# Patient Record
(demographics unavailable — no encounter records)

---

## 2025-05-08 NOTE — PHYSICAL EXAM
[General Appearance - Alert] : alert [Oriented To Time, Place, And Person] : oriented to person, place, and time [Cranial Nerves Optic (II)] : visual acuity intact bilaterally,  pupils equal round and reactive to light [Cranial Nerves Oculomotor (III)] : extraocular motion intact [Cranial Nerves Trigeminal (V)] : facial sensation intact symmetrically [Cranial Nerves Facial (VII)] : face symmetrical [Cranial Nerves Vestibulocochlear (VIII)] : hearing was intact bilaterally [Cranial Nerves Glossopharyngeal (IX)] : tongue and palate midline [Cranial Nerves Accessory (XI - Cranial And Spinal)] : head turning and shoulder shrug symmetric [Cranial Nerves Hypoglossal (XII)] : there was no tongue deviation with protrusion [Motor Tone] : muscle tone was normal in all four extremities [Motor Strength] : muscle strength was normal in all four extremities [Abnormal Walk] : normal gait [Balance] : balance was intact [PERRL With Normal Accommodation] : pupils were equal in size, round, reactive to light, with normal accommodation [Extraocular Movements] : extraocular movements were intact

## 2025-05-12 NOTE — ASSESSMENT
[FreeTextEntry1] : LIDIA CHOW is a 67 year female who presents with progression left eye vision loss with associated afferent pupillary defect OS and neuro-ophthalmologic evaluation consistent with progressive, compressive left optic neuropathy.  I independently reviewed and interpreted her latest MRI Brain Orbits with and without gadolinium 5/5/25 which demonstrates a small enhancing lesion of the left lesser sphenoid wing measuring approximately 6 x 7 x 4 mm which appears to elevate the intracranial segment of the left optic nerve. In retrospect, this lesion was present on MRI brain orbits in 4/18/22 and has progressed.  Natural history and alternative management strategies discussed. Surgical resection with optic nerve decompression discussed as a means to attempt to preserve remaining left eye vision. Risks of surgery including but not limited to bleeding, infection, vascular injury, brain hemorrhage, brain swelling, stroke, coma/death, weakness/paralysis, visual/sensory loss, left eye blindness, loss of speech/language/cognitive memory function, changes in personality/behavior, seizures, failure to resect, recurrence, need for subsequent procedures discussed. Ms. Guillaume and her  understand risks, benefits, alternatives, and are considering my recommendations. They understand that the goal of surgery would be to preserve current visual function in the left eye, that recovery of lost vision is uncertain and otherwise not predictable, and that there is a risk of complete loss of left eye vision with surgery. We did discuss the alternative management strategy of stereotactic radiosurgery/ radiotherapy which would confer significant risk of complete left eye visual loss but would avoid other risks associated with surgery. I have offered to facilitate a referral to Dr. Mike Greene and Dr. Bennett Su if they wish to further the discussion regarding radiosurgery/radiotherapy.   If she decides to proceed with surgery, we will schedule her for left pterional craniotomy for resection of lesser sphenoid wing meningioma and optic nerve decompression with frameless stereotactic navigation in the near future. She will require a frameless protocol MRI with gadolinium and frameless protocol CT head without contrast prior to the scheduled date for surgery. If she decides to investigate radiation therapy options in more detail, we will help facilitate expedited consultation with my partners Dr. Mike Greene and Dr. Bennett Su. If she decides not to proceed with either, then I have recommended a follow up MRI brain and orbits with and without gadolinium in 6 months with close clinical surveillance with Dr. Stuart to assess for interval progression. The patient and her  understand that my recommendation is to proceed with surgical resection and optic nerve decompression to maximize the chances of left eye vision preservation.  I have asked the patient and her  to contact me for any symptomatic development or progression in the interim at which time we can obtain expedited follow up imaging.  A total of 60 minutes was spent relative to this encounter.

## 2025-05-12 NOTE — HISTORY OF PRESENT ILLNESS
[de-identified] : LIDIA CHOW is a 67 year female with a PMH of breast cancer, melanoma, restless leg syndrome, lumbar radiculopathy, lumbar stenosis, who presents to the office today at the request of Dr. Goldy Stuart with her  in attendance for neurosurgical consultation due to a suspected meningioma and an incidental, suspected cavernous malformation. She was being evaluated for progressive loss of left eye vision, more significant over the past year.  She has a history of left optic neuropathy with an unrevealing workup under the direction of Dr. 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 This was stable for years but has new gradual deterioration over the past year. She has not been on any drugs toxic to the optic nerve and had completed chemotherapy in 2018.  Dr. Stuart's evaluation demonstrated worsening of left eyes visual fields and progressive optic atrophy compared to 2022. There is an interval increase in the thinning of the RNFL in the left eye. Visual fields notable for loss of superior and inferior temporal fields and superior nasal field with compromised inferior nasal field in the left eye. Imaging obtained for further evaluation reviewed and detailed below.  Med Hx: basal cell carcinoma, squamous cell carcinoma Surg Hx: cholecystecomy, laparoscopic oophorectomy , right breast lumpectomy, breast biopsies, tonsillectomy, varicose vein surgery Meds:  Allergies:  Penicillin, sulfa antibiotics Soc Hx: never smoker, rare ETOH, lives with

## 2025-05-12 NOTE — DATA REVIEWED
[de-identified] : MRI brain orbits with and without contrast 5/5/25:  MRI BRAIN IMPRESSION: Stable right temporal periventricular cavernous angioma.   Subcentimeter pineal gland cyst.   MR orbits findings: Small enhancing mass in the region of the left anterior clinoid process measuring 0.6 x 0.7 x 0.4 cm (AP X TRV X CC). 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 In retrospect there is perhaps minimal asymmetric enhancement in this region on the prior study noted on coronal postcontrast images only.   Intraorbital and periorbital soft tissues are otherwise unremarkable.   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

## 2025-05-12 NOTE — ASSESSMENT
[FreeTextEntry1] : LIDIA HCOW is a 67 year female who presents with progression left eye vision loss with associated afferent pupillary defect OS and neuro-ophthalmologic evaluation consistent with progressive, compressive left optic neuropathy.  I independently reviewed and interpreted her latest MRI Brain Orbits with and without gadolinium 5/5/25 which demonstrates a small enhancing lesion of the left lesser sphenoid wing measuring approximately 6 x 7 x 4 mm which appears to elevate the intracranial segment of the left optic nerve. In retrospect, this lesion was present on MRI brain orbits in 4/18/22 and has progressed.  Natural history and alternative management strategies discussed. Surgical resection with optic nerve decompression discussed as a means to attempt to preserve remaining left eye vision. Risks of surgery including but not limited to bleeding, infection, vascular injury, brain hemorrhage, brain swelling, stroke, coma/death, weakness/paralysis, visual/sensory loss, left eye blindness, loss of speech/language/cognitive memory function, changes in personality/behavior, seizures, failure to resect, recurrence, need for subsequent procedures discussed. Ms. Guillaume and her  understand risks, benefits, alternatives, and are considering my recommendations. They understand that the goal of surgery would be to preserve current visual function in the left eye, that recovery of lost vision is uncertain and otherwise not predictable, and that there is a risk of complete loss of left eye vision with surgery. We did discuss the alternative management strategy of stereotactic radiosurgery/ radiotherapy which would confer significant risk of complete left eye visual loss but would avoid other risks associated with surgery. I have offered to facilitate a referral to Dr. Mike Greene and Dr. Bennett Su if they wish to further the discussion regarding radiosurgery/radiotherapy.   If she decides to proceed with surgery, we will schedule her for left pterional craniotomy for resection of lesser sphenoid wing meningioma and optic nerve decompression with frameless stereotactic navigation in the near future. She will require a frameless protocol MRI with gadolinium and frameless protocol CT head without contrast prior to the scheduled date for surgery. If she decides to investigate radiation therapy options in more detail, we will help facilitate expedited consultation with my partners Dr. Mike Greene and Dr. Bennett Su. If she decides not to proceed with either, then I have recommended a follow up MRI brain and orbits with and without gadolinium in 6 months with close clinical surveillance with Dr. Stuart to assess for interval progression. The patient and her  understand that my recommendation is to proceed with surgical resection and optic nerve decompression to maximize the chances of left eye vision preservation.  I have asked the patient and her  to contact me for any symptomatic development or progression in the interim at which time we can obtain expedited follow up imaging.  A total of 60 minutes was spent relative to this encounter.

## 2025-05-12 NOTE — HISTORY OF PRESENT ILLNESS
[de-identified] : LIDIA CHOW is a 67 year female with a PMH of breast cancer, melanoma, restless leg syndrome, lumbar radiculopathy, lumbar stenosis, who presents to the office today at the request of Dr. Goldy Stuart with her  in attendance for neurosurgical consultation due to a suspected meningioma and an incidental, suspected cavernous malformation. She was being evaluated for progressive loss of left eye vision, more significant over the past year.  She has a history of left optic neuropathy with an unrevealing workup under the direction of Dr. 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 This was stable for years but has new gradual deterioration over the past year. She has not been on any drugs toxic to the optic nerve and had completed chemotherapy in 2018.  Dr. Stuart's evaluation demonstrated worsening of left eyes visual fields and progressive optic atrophy compared to 2022. There is an interval increase in the thinning of the RNFL in the left eye. Visual fields notable for loss of superior and inferior temporal fields and superior nasal field with compromised inferior nasal field in the left eye. Imaging obtained for further evaluation reviewed and detailed below.  Med Hx: basal cell carcinoma, squamous cell carcinoma Surg Hx: cholecystecomy, laparoscopic oophorectomy , right breast lumpectomy, breast biopsies, tonsillectomy, varicose vein surgery Meds:  Allergies:  Penicillin, sulfa antibiotics Soc Hx: never smoker, rare ETOH, lives with

## 2025-05-12 NOTE — DATA REVIEWED
[de-identified] : MRI brain orbits with and without contrast 5/5/25:  MRI BRAIN IMPRESSION: Stable right temporal periventricular cavernous angioma.   Subcentimeter pineal gland cyst.   MR orbits findings: Small enhancing mass in the region of the left anterior clinoid process measuring 0.6 x 0.7 x 0.4 cm (AP X TRV X CC). 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 In retrospect there is perhaps minimal asymmetric enhancement in this region on the prior study noted on coronal postcontrast images only.   Intraorbital and periorbital soft tissues are otherwise unremarkable.   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

## 2025-05-21 NOTE — PHYSICAL EXAM
[General Appearance - Alert] : alert [General Appearance - In No Acute Distress] : in no acute distress [Cranial Nerves Oculomotor (III)] : extraocular motion intact [Cranial Nerves Trigeminal (V)] : facial sensation intact symmetrically [Cranial Nerves Facial (VII)] : face symmetrical [Cranial Nerves Vestibulocochlear (VIII)] : hearing was intact bilaterally [Cranial Nerves Glossopharyngeal (IX)] : tongue and palate midline [Cranial Nerves Accessory (XI - Cranial And Spinal)] : head turning and shoulder shrug symmetric [Cranial Nerves Hypoglossal (XII)] : there was no tongue deviation with protrusion [Motor Strength] : muscle strength was normal in all four extremities [Sensation Tactile Decrease] : light touch was intact [FreeTextEntry5] : decreased vision left eye

## 2025-05-21 NOTE — DATA REVIEWED
[de-identified] : MRI: MRI brain orbits with and without contrast 5/5/25:  MRI BRAIN IMPRESSION: Stable right temporal periventricular cavernous angioma.  Subcentimeter pineal gland cyst.  MR orbits findings: Small enhancing mass in the region of the left anterior clinoid process measuring 0.6 x 0.7 x 0.4 cm (AP X TRV X CC). 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 In retrospect there is perhaps minimal asymmetric enhancement in this region on the prior study noted on coronal postcontrast images only.  Intraorbital and periorbital soft tissues are otherwise unremarkable.  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

## 2025-05-21 NOTE — END OF VISIT
[FreeTextEntry3] : I have seen the patient and reviewed the case together with PA and I agree with the final recommendations and plan of care.  Mike Greene MD Neurosurgery  [Time Spent: ___ minutes] : I have spent [unfilled] minutes of time on the encounter which excludes teaching and separately reported services.

## 2025-05-21 NOTE — DATA REVIEWED
[de-identified] : MRI: MRI brain orbits with and without contrast 5/5/25:  MRI BRAIN IMPRESSION: Stable right temporal periventricular cavernous angioma.  Subcentimeter pineal gland cyst.  MR orbits findings: Small enhancing mass in the region of the left anterior clinoid process measuring 0.6 x 0.7 x 0.4 cm (AP X TRV X CC). 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 In retrospect there is perhaps minimal asymmetric enhancement in this region on the prior study noted on coronal postcontrast images only.  Intraorbital and periorbital soft tissues are otherwise unremarkable.  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

## 2025-05-21 NOTE — ASSESSMENT
[FreeTextEntry1] : I have discussed the natural history and treatment options for optic sheath meningiomas with the patient. I explained the different types of meningiomas and the indications for observation and imaging surveillance, medical management with antiepileptic medications and steroids, chemotherapy, radiation therapy, radiosurgery and surgery. I explained the indications of a combination of these treatment options.  In the end, I favor microsurgical resection of the meningioma or fractionated radiotherapy to treat the enlarging meningioma over Gamma Knife Radiosurgery due to the risk of further damage to the optic nerve and loss of vision that may result from high dosed single session focus radiation.  I have provided a referral to my partner in Radiation Oncology, Dr. Bennett Su for risk/benefit discussion of fractionated radiotherapy. The patient understands the plan of care and is in agreement.

## 2025-05-21 NOTE — HISTORY OF PRESENT ILLNESS
[de-identified] : LIDIA CHOW is a 67 year female with a PMH of breast cancer, skin cancer, restless leg syndrome, lumbar radiculopathy, lumbar stenosis, who presents to the office today at the request of my partner, Dr. Rajan Barrios, for neurosurgical consultation due to Gamma Knife Radiosurgery treatment discussion for left lesser sphenoid wing enhancing lesion most consistent with a meningioma in the setting of left optic neuropathy and progressive resultant vision loss.  The patient has been following with Neuroophthalmologist, Dr. 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 Her vision had been stable since 2022 until she recently developed a progression of the vision loss on most recent evaluation and Visual fields notable for loss of superior and inferior temporal fields and superior nasal field with compromised inferior nasal field in the left eye. Imaging obtained for further evaluation reviewed and was revealing for small lesser sphenoid wing meningioma measuring 0.6 x 0.7 x 0.4 cm which appears to be surrounding and perhaps compressing the intracranial optic nerve prior to the optic chiasm.  She has been offered a pterional craniotomy for tumor resection by Dr. Barrios for resection of the lesion with the intent to preserve her current vision.  She is here for Gamma Knife Radiosurgery treatment discussion as an alternative management strategy.  Med Hx: basal cell carcinoma, squamous cell carcinoma Surg Hx: cholecystectomy, laparoscopic oophorectomy, right breast lumpectomy, breast biopsies, tonsillectomy, varicose vein surgery Allergies: Penicillin, sulfa antibiotics Soc Hx: never smoker, rare ETOH, lives with

## 2025-05-21 NOTE — HISTORY OF PRESENT ILLNESS
[de-identified] : LIDIA CHOW is a 67 year female with a PMH of breast cancer, skin cancer, restless leg syndrome, lumbar radiculopathy, lumbar stenosis, who presents to the office today at the request of my partner, Dr. Rajan Barrios, for neurosurgical consultation due to Gamma Knife Radiosurgery treatment discussion for left lesser sphenoid wing enhancing lesion most consistent with a meningioma in the setting of left optic neuropathy and progressive resultant vision loss.  The patient has been following with Neuroophthalmologist, Dr. 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 Her vision had been stable since 2022 until she recently developed a progression of the vision loss on most recent evaluation and Visual fields notable for loss of superior and inferior temporal fields and superior nasal field with compromised inferior nasal field in the left eye. Imaging obtained for further evaluation reviewed and was revealing for small lesser sphenoid wing meningioma measuring 0.6 x 0.7 x 0.4 cm which appears to be surrounding and perhaps compressing the intracranial optic nerve prior to the optic chiasm.  She has been offered a pterional craniotomy for tumor resection by Dr. Barrios for resection of the lesion with the intent to preserve her current vision.  She is here for Gamma Knife Radiosurgery treatment discussion as an alternative management strategy.  Med Hx: basal cell carcinoma, squamous cell carcinoma Surg Hx: cholecystectomy, laparoscopic oophorectomy, right breast lumpectomy, breast biopsies, tonsillectomy, varicose vein surgery Allergies: Penicillin, sulfa antibiotics Soc Hx: never smoker, rare ETOH, lives with

## 2025-06-12 NOTE — REASON FOR VISIT
[Consideration of Therapy for Benign Disease] : consideration of therapy for benign disease [Brain Tumor] : brain tumor [Spouse] : spouse

## 2025-06-12 NOTE — VITALS
[Maximal Pain Intensity: 0/10] : 0/10 [Least Pain Intensity: 0/10] : 0/10 [80: Normal activity with effort; some signs or symptoms of disease.] : 80: Normal activity with effort; some signs or symptoms of disease.  [Date: ____________] : Patient's last distress assessment performed on [unfilled]. [2 - Distress Level] : Distress Level: 2

## 2025-06-13 NOTE — PHYSICAL EXAM
[Hearing Threshold Finger Rub Not [place]] : hearing was normal [] : no respiratory distress [Respiration, Rhythm And Depth] : normal respiratory rhythm and effort [Exaggerated Use Of Accessory Muscles For Inspiration] : no accessory muscle use [Heart Rate And Rhythm] : heart rate and rhythm were normal [Arterial Pulses Normal] : the arterial pulses were normal [Motor Tone] : muscle strength and tone were normal [Normal] : oriented to person, place and time, the affect was normal, the mood was normal and not anxious [de-identified] : decreased vision in the left eye

## 2025-06-13 NOTE — PHYSICAL EXAM
[Hearing Threshold Finger Rub Not [place]] : hearing was normal [] : no respiratory distress [Respiration, Rhythm And Depth] : normal respiratory rhythm and effort [Exaggerated Use Of Accessory Muscles For Inspiration] : no accessory muscle use [Heart Rate And Rhythm] : heart rate and rhythm were normal [Arterial Pulses Normal] : the arterial pulses were normal [Motor Tone] : muscle strength and tone were normal [Normal] : oriented to person, place and time, the affect was normal, the mood was normal and not anxious [de-identified] : decreased vision in the left eye

## 2025-06-13 NOTE — HISTORY OF PRESENT ILLNESS
[FreeTextEntry1] : LIDIA CHOW is a 67-year-old female with a meningioma with progressive vision loss, presenting today for consultation for gamma knife radiosurgery as referred by Dr. Greene.  [facility] of right breast cancer s/p chemo & lumpectomy (treated at [facility]), skin cancer, restless leg syndrome, lumbar radiculopathy, lumbar stenosis  The patient has been following with Neuroophthalmologist, Dr. 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 Her vision had been stable since 2022 until she recently developed a progression of the vision loss on most recent evaluation and Visual fields notable for loss of superior and inferior temporal fields and superior nasal field with compromised inferior nasal field in the left eye.  04/18/22 MRI Brain ([facility]) showed a 7mm enhancing lesion right posterior temporal periventricular region, most suggestive of cavernous angioma. Given absent prior studies for comparison and history of breast cancer, a follow-up brain MRI in 3-4 months suggest. MR Orbits showed no evidence of intraorbital mass or enhancing lesion. Symmetric appearance orbital globes and retrobulbar soft tissues including extraocular muscles. Mildly prominent optic nerve sheath fluid bilaterally consistent with normal variant. There is no definite abnormal enhancement at the level of the optic nerves or optic chiasm. Mild right maxillary and minimal bilateral ethmoid sinus mucosal thickening.  08/30/22 MRI Brain ([facility]) showed stable 8mm right posterior temporal periventricular lesion suggestive of cavernous angioma.  02/24/25 She saw Dr. 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 Visual acuity from March 2024 was about the same but stated that her left eye felt slightly darker over the last 2 weeks.  04/17/25 Evaluated in office by Dr. Stuart.  05/05/25 MRI orbits ([facility]) demonstrated a small enhancing mass int he region of the left anterior clinoid process measuring 0.6 x 0.7 x 0.4cm. 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 In retrospect there is perhaps a minimal asymmetric enhancement in this region on the prior study noted on coronal postcontrast images only.  06/12/25 Ms. Mariaalyssazabrina presents today to discuss treatment with radiation therapy as an alternative management strategy to treat the enlarging meningioma. Patient denies headaches, dizziness, n/v, numbness but reports of left eye vision loss. She has been offered a pterional craniotomy for tumor resection by Dr. Barrios for resection of the lesion with the intent to preserve her current vision.

## 2025-06-13 NOTE — HISTORY OF PRESENT ILLNESS
[FreeTextEntry1] : LIDIA CHOW is a 67-year-old female with a meningioma with progressive vision loss, presenting today for consultation for gamma knife radiosurgery as referred by Dr. Greene.  [facility] of right breast cancer s/p chemo & lumpectomy (treated at [facility]), skin cancer, restless leg syndrome, lumbar radiculopathy, lumbar stenosis  The patient has been following with Neuroophthalmologist, Dr. 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 Her vision had been stable since 2022 until she recently developed a progression of the vision loss on most recent evaluation and Visual fields notable for loss of superior and inferior temporal fields and superior nasal field with compromised inferior nasal field in the left eye.  04/18/22 MRI Brain ([facility]) showed a 7mm enhancing lesion right posterior temporal periventricular region, most suggestive of cavernous angioma. Given absent prior studies for comparison and history of breast cancer, a follow-up brain MRI in 3-4 months suggest. MR Orbits showed no evidence of intraorbital mass or enhancing lesion. Symmetric appearance orbital globes and retrobulbar soft tissues including extraocular muscles. Mildly prominent optic nerve sheath fluid bilaterally consistent with normal variant. There is no definite abnormal enhancement at the level of the optic nerves or optic chiasm. Mild right maxillary and minimal bilateral ethmoid sinus mucosal thickening.  08/30/22 MRI Brain ([facility]) showed stable 8mm right posterior temporal periventricular lesion suggestive of cavernous angioma.  02/24/25 She saw Dr. 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 Visual acuity from March 2024 was about the same but stated that her left eye felt slightly darker over the last 2 weeks.  04/17/25 Evaluated in office by Dr. Stuart.  05/05/25 MRI orbits ([facility]) demonstrated a small enhancing mass int he region of the left anterior clinoid process measuring 0.6 x 0.7 x 0.4cm. 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 In retrospect there is perhaps a minimal asymmetric enhancement in this region on the prior study noted on coronal postcontrast images only.  06/12/25 Ms. Mariaalyssazabrina presents today to discuss treatment with radiation therapy as an alternative management strategy to treat the enlarging meningioma. Patient denies headaches, dizziness, n/v, numbness but reports of left eye vision loss. She has been offered a pterional craniotomy for tumor resection by Dr. Barrios for resection of the lesion with the intent to preserve her current vision.

## 2025-06-13 NOTE — PHYSICAL EXAM
[Hearing Threshold Finger Rub Not [place]] : hearing was normal [] : no respiratory distress [Respiration, Rhythm And Depth] : normal respiratory rhythm and effort [Exaggerated Use Of Accessory Muscles For Inspiration] : no accessory muscle use [Heart Rate And Rhythm] : heart rate and rhythm were normal [Arterial Pulses Normal] : the arterial pulses were normal [Motor Tone] : muscle strength and tone were normal [Normal] : oriented to person, place and time, the affect was normal, the mood was normal and not anxious [de-identified] : decreased vision in the left eye

## 2025-06-13 NOTE — REVIEW OF SYSTEMS
[Visual Disturbances] : visual disturbances [Negative] : Allergic/Immunologic [Gait Disturbance] : no gait disturbance [Eye Pain] : no eye pain [Dizziness] : no dizziness [Difficulty Walking] : no difficulty walking [FreeTextEntry3] : left eye vision loss

## 2025-06-13 NOTE — HISTORY OF PRESENT ILLNESS
[FreeTextEntry1] : LIDIA CHOW is a 67-year-old female with a meningioma with progressive vision loss, presenting today for consultation for gamma knife radiosurgery as referred by Dr. Greene.  [facility] of right breast cancer s/p chemo & lumpectomy (treated at [facility]), skin cancer, restless leg syndrome, lumbar radiculopathy, lumbar stenosis  The patient has been following with Neuroophthalmologist, Dr. 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 Her vision had been stable since 2022 until she recently developed a progression of the vision loss on most recent evaluation and Visual fields notable for loss of superior and inferior temporal fields and superior nasal field with compromised inferior nasal field in the left eye.  04/18/22 MRI Brain ([facility]) showed a 7mm enhancing lesion right posterior temporal periventricular region, most suggestive of cavernous angioma. Given absent prior studies for comparison and history of breast cancer, a follow-up brain MRI in 3-4 months suggest. MR Orbits showed no evidence of intraorbital mass or enhancing lesion. Symmetric appearance orbital globes and retrobulbar soft tissues including extraocular muscles. Mildly prominent optic nerve sheath fluid bilaterally consistent with normal variant. There is no definite abnormal enhancement at the level of the optic nerves or optic chiasm. Mild right maxillary and minimal bilateral ethmoid sinus mucosal thickening.  08/30/22 MRI Brain ([facility]) showed stable 8mm right posterior temporal periventricular lesion suggestive of cavernous angioma.  02/24/25 She saw Dr. 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 Visual acuity from March 2024 was about the same but stated that her left eye felt slightly darker over the last 2 weeks.  04/17/25 Evaluated in office by Dr. Stuart.  05/05/25 MRI orbits ([facility]) demonstrated a small enhancing mass int he region of the left anterior clinoid process measuring 0.6 x 0.7 x 0.4cm. 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 In retrospect there is perhaps a minimal asymmetric enhancement in this region on the prior study noted on coronal postcontrast images only.  06/12/25 Ms. Mariaalyssazabrina presents today to discuss treatment with radiation therapy as an alternative management strategy to treat the enlarging meningioma. Patient denies headaches, dizziness, n/v, numbness but reports of left eye vision loss. She has been offered a pterional craniotomy for tumor resection by Dr. Barrios for resection of the lesion with the intent to preserve her current vision.

## 2025-07-10 NOTE — DISEASE MANAGEMENT
[Clinical] : TNM Stage: c [N/A] : Currently not applicable [TTNM] : x [NTNM] : x [MTNM] : x [de-identified] : 931 [de-identified] : 1246 [de-identified] : meningioma Left side

## 2025-07-10 NOTE — DISEASE MANAGEMENT
[Clinical] : TNM Stage: c [N/A] : Currently not applicable [TTNM] : x [NTNM] : x [MTNM] : x [de-identified] : 379 [de-identified] : 7668 [de-identified] : meningioma Left side

## 2025-07-10 NOTE — HISTORY OF PRESENT ILLNESS
[FreeTextEntry1] : 7/9/25 FX 2/28 Doing well with treatment.  No vision changes are reported.  She also denied any headache gait imbalance or nausea.She was instructed to apply a small amount of Aquaphor lotion to the left temple and eye lid area.

## 2025-07-16 NOTE — REVIEW OF SYSTEMS
[Nausea: Grade 1 - Loss of appetite without alteration in eating habits] : Nausea: Grade 1 - Loss of appetite without alteration in eating habits [Fatigue: Grade 1 - Fatigue relieved by rest] : Fatigue: Grade 1 - Fatigue relieved by rest [Headache: Grade 0] : Headache: Grade 0

## 2025-07-17 NOTE — DISEASE MANAGEMENT
[Clinical] : TNM Stage: c [N/A] : Currently not applicable [TTNM] : x [NTNM] : x [MTNM] : x [de-identified] : 4301 [de-identified] : 6795 [de-identified] : meningioma Left side

## 2025-07-17 NOTE — HISTORY OF PRESENT ILLNESS
[FreeTextEntry1] : 7/9/25 FX 2/28 Doing well with treatment.  No vision changes are reported.  She also denied any headache gait imbalance or nausea. She was instructed to apply a small amount of Aquaphor lotion to the left temple and eye lid area.      7/16/25 FX 7 of 28.  Had a few episodes of nausea with no vomiting.  Denies any HA or vision issues.  No other c/o. Using small amount of Aquaphor to the temple area

## 2025-07-17 NOTE — DISEASE MANAGEMENT
[Clinical] : TNM Stage: c [N/A] : Currently not applicable [TTNM] : x [NTNM] : x [MTNM] : x [de-identified] : 5532 [de-identified] : 3612 [de-identified] : meningioma Left side

## 2025-07-22 NOTE — REVIEW OF SYSTEMS
[Fatigue: Grade 1 - Fatigue relieved by rest] : Fatigue: Grade 1 - Fatigue relieved by rest [Blurred Vision: Grade 0] : Blurred Vision: Grade 0 [Dizziness: Grade 0] : Dizziness: Grade 0  [Headache: Grade 0] : Headache: Grade 0

## 2025-07-24 NOTE — HISTORY OF PRESENT ILLNESS
[FreeTextEntry1] : 7/9/25 FX 2/28 Doing well with treatment.  No vision changes are reported.  She also denied any headache gait imbalance or nausea. She was instructed to apply a small amount of Aquaphor lotion to the left temple and eye lid area.      7/16/25 FX 7 of 28.  Had a few episodes of nausea with no vomiting.  Denies any HA or vision issues.  No other c/o. Using small amount of Aquaphor to the temple area  7/22/2025 Ms. Linda presents today for her OTV.  She completed 11/28 fractions to a dose of 1980 cGy  to the brain.  Denies any HA,vision issues or nausea.  Using a small amount of Aquaphor as directed.

## 2025-07-24 NOTE — DISEASE MANAGEMENT
[Clinical] : TNM Stage: c [N/A] : Currently not applicable [TTNM] : x [NTNM] : x [MTNM] : x [de-identified] : 2453 [de-identified] : 9610 [de-identified] : meningioma Left side

## 2025-07-24 NOTE — DISEASE MANAGEMENT
[Clinical] : TNM Stage: c [N/A] : Currently not applicable [TTNM] : x [NTNM] : x [MTNM] : x [de-identified] : 8015 [de-identified] : 7491 [de-identified] : meningioma Left side

## 2025-07-30 NOTE — HISTORY OF PRESENT ILLNESS
[FreeTextEntry1] : 7/9/25 FX 2/28 Doing well with treatment.  No vision changes are reported.  She also denied any headache gait imbalance or nausea. She was instructed to apply a small amount of Aquaphor lotion to the left temple and eye lid area.      7/16/25 FX 7 of 28.  Had a few episodes of nausea with no vomiting.  Denies any HA or vision issues.  No other c/o. Using small amount of Aquaphor to the temple area  7/22/2025 Ms. Linda presents today for her OTV.  She completed 11/28 fractions to a dose of 1980 cGy  to the brain.  Denies any HA,vision issues or nausea.  Using a small amount of Aquaphor as directed.    7/29/25 FX 16/28 Continues to do well with treatment.  She denied any HA, vision issues or dizziness.  She is using a small amount of Aquaphor as directed

## 2025-07-30 NOTE — DISEASE MANAGEMENT
[Clinical] : TNM Stage: c [N/A] : Currently not applicable [TTNM] : x [NTNM] : x [MTNM] : x [de-identified] : 7763 [de-identified] : 6347 [de-identified] : meningioma Left side

## 2025-07-30 NOTE — DISEASE MANAGEMENT
[Clinical] : TNM Stage: c [N/A] : Currently not applicable [TTNM] : x [NTNM] : x [MTNM] : x [de-identified] : 5728 [de-identified] : 8604 [de-identified] : meningioma Left side

## 2025-07-30 NOTE — PHYSICAL EXAM
[] : no respiratory distress [Exaggerated Use Of Accessory Muscles For Inspiration] : no accessory muscle use [No Focal Deficits] : no focal deficits [Normal] : oriented to person, place and time, the affect was normal, the mood was normal and not anxious